# Patient Record
Sex: FEMALE | Race: WHITE | NOT HISPANIC OR LATINO | URBAN - NONMETROPOLITAN AREA
[De-identification: names, ages, dates, MRNs, and addresses within clinical notes are randomized per-mention and may not be internally consistent; named-entity substitution may affect disease eponyms.]

---

## 2019-01-24 ENCOUNTER — IMPORTED ENCOUNTER (OUTPATIENT)
Dept: URBAN - NONMETROPOLITAN AREA CLINIC 1 | Facility: CLINIC | Age: 54
End: 2019-01-24

## 2019-01-24 PROBLEM — H52.03: Noted: 2019-01-24

## 2019-01-24 PROBLEM — H53.021: Noted: 2019-01-24

## 2019-01-24 PROCEDURE — 92012 INTRM OPH EXAM EST PATIENT: CPT

## 2019-05-21 ENCOUNTER — IMPORTED ENCOUNTER (OUTPATIENT)
Dept: URBAN - NONMETROPOLITAN AREA CLINIC 1 | Facility: CLINIC | Age: 54
End: 2019-05-21

## 2019-05-21 PROBLEM — H52.03: Noted: 2019-01-24

## 2019-05-21 PROBLEM — H53.143: Noted: 2019-05-21

## 2019-05-21 PROBLEM — H53.021: Noted: 2019-01-24

## 2019-05-21 PROCEDURE — 99213 OFFICE O/P EST LOW 20 MIN: CPT

## 2020-01-29 ENCOUNTER — IMPORTED ENCOUNTER (OUTPATIENT)
Dept: URBAN - NONMETROPOLITAN AREA CLINIC 1 | Facility: CLINIC | Age: 55
End: 2020-01-29

## 2020-01-29 PROBLEM — H52.03: Noted: 2019-01-24

## 2020-01-29 PROBLEM — H04.123: Noted: 2020-01-29

## 2020-01-29 PROBLEM — H53.021: Noted: 2019-01-24

## 2020-01-29 PROCEDURE — 92012 INTRM OPH EXAM EST PATIENT: CPT

## 2020-01-29 NOTE — PATIENT DISCUSSION
HOWIE-Explained HOWIE and associated symptoms.-Recommend increasing Omega 3s.-Pt to begin artificial tears OU QID PRN. Pt will contact us if this does not provide relief. Consider punctal plugs in that case. start lotemax qid/tid/bi/qd x 1wk each oustart xiidra bid ou (send to [facility])

## 2022-04-15 ASSESSMENT — VISUAL ACUITY
OS_SC: 20/20
OU_SC: 20/20
OD_SC: 20/25
OS_SC: 20/20-1
OD_SC: 20/30
OD_SC: 20/20
OS_SC: 20/30

## 2022-04-15 ASSESSMENT — TONOMETRY
OD_IOP_MMHG: 18
OS_IOP_MMHG: 20
OD_IOP_MMHG: 22
OS_IOP_MMHG: 19